# Patient Record
Sex: FEMALE | Race: OTHER | HISPANIC OR LATINO | URBAN - METROPOLITAN AREA
[De-identification: names, ages, dates, MRNs, and addresses within clinical notes are randomized per-mention and may not be internally consistent; named-entity substitution may affect disease eponyms.]

---

## 2020-12-08 ENCOUNTER — EMERGENCY (EMERGENCY)
Facility: HOSPITAL | Age: 25
LOS: 1 days | Discharge: HOME | End: 2020-12-08
Attending: EMERGENCY MEDICINE
Payer: COMMERCIAL

## 2020-12-08 VITALS
OXYGEN SATURATION: 97 % | WEIGHT: 175.05 LBS | RESPIRATION RATE: 20 BRPM | SYSTOLIC BLOOD PRESSURE: 138 MMHG | HEIGHT: 60 IN | DIASTOLIC BLOOD PRESSURE: 83 MMHG | TEMPERATURE: 98 F | HEART RATE: 90 BPM

## 2020-12-08 LAB
APPEARANCE UR: CLEAR — SIGNIFICANT CHANGE UP
BILIRUB UR-MCNC: NEGATIVE — SIGNIFICANT CHANGE UP
COLOR SPEC: YELLOW — SIGNIFICANT CHANGE UP
DIFF PNL FLD: ABNORMAL
GLUCOSE UR QL: NEGATIVE — SIGNIFICANT CHANGE UP
HCG UR QL: NEGATIVE — SIGNIFICANT CHANGE UP
KETONES UR-MCNC: NEGATIVE — SIGNIFICANT CHANGE UP
LEUKOCYTE ESTERASE UR-ACNC: NEGATIVE — SIGNIFICANT CHANGE UP
NITRITE UR-MCNC: NEGATIVE — SIGNIFICANT CHANGE UP
PH UR: 6 — SIGNIFICANT CHANGE UP (ref 5–8)
PROT UR-MCNC: NEGATIVE — SIGNIFICANT CHANGE UP
SP GR SPEC: 1.01 — SIGNIFICANT CHANGE UP (ref 1.01–1.02)
UROBILINOGEN FLD QL: NEGATIVE — SIGNIFICANT CHANGE UP

## 2020-12-08 PROCEDURE — 99284 EMERGENCY DEPT VISIT MOD MDM: CPT

## 2020-12-08 RX ORDER — IBUPROFEN 200 MG
600 TABLET ORAL ONCE
Refills: 0 | Status: COMPLETED | OUTPATIENT
Start: 2020-12-08 | End: 2020-12-08

## 2020-12-09 LAB
CULTURE RESULTS: SIGNIFICANT CHANGE UP
SPECIMEN SOURCE: SIGNIFICANT CHANGE UP

## 2020-12-09 PROCEDURE — 81001 URINALYSIS AUTO W/SCOPE: CPT

## 2020-12-09 PROCEDURE — 74176 CT ABD & PELVIS W/O CONTRAST: CPT | Mod: 26

## 2020-12-09 PROCEDURE — 81025 URINE PREGNANCY TEST: CPT

## 2020-12-09 PROCEDURE — 87086 URINE CULTURE/COLONY COUNT: CPT

## 2020-12-09 PROCEDURE — 99284 EMERGENCY DEPT VISIT MOD MDM: CPT | Mod: 25

## 2020-12-09 PROCEDURE — 74176 CT ABD & PELVIS W/O CONTRAST: CPT

## 2020-12-09 RX ADMIN — Medication 600 MILLIGRAM(S): at 00:10

## 2020-12-09 RX ADMIN — Medication 600 MILLIGRAM(S): at 00:45

## 2020-12-09 NOTE — ED PROVIDER NOTE - MUSCULOSKELETAL, MLM
Spine appears normal, range of motion is not limited, no muscle or joint tenderness Spine appears normal, range of motion is not limited, no muscle or joint tenderness. Normal gait. Normal LE strength and sensory function.

## 2020-12-09 NOTE — ED PROVIDER NOTE - PATIENT PORTAL LINK FT
You can access the FollowMyHealth Patient Portal offered by [facility] by registering at the following website: [email or web address]. By joining [employer]’s FollowMyHealth portal, you will also be able to view your health information using other applications (apps) compatible with our system.

## 2020-12-09 NOTE — ED ADULT NURSE NOTE - OBJECTIVE STATEMENT
pt came in with complaints of right flank pain started 2 days ago. Denies N/V/D, chest pain, sob and fever.

## 2020-12-09 NOTE — ED PROVIDER NOTE - NSFOLLOWUPINSTRUCTIONS_ED_ALL_ED_FT
For pain continue tylenol 650mg or ibuprofen (advil/motrin) 600mg every 6 hours.  Followup with PMD for reevaluation.  Return to ED if you develop fever>100.8F, vomiting, abdominal pain or worsening back pain.      Flank Pain, Adult      Flank pain is pain in your side. The flank is the area of your side between your upper belly (abdomen) and your back. The pain may occur over a short time (acute), or it may be long-term or come back often (chronic). It may be mild or very bad. Pain in this area can be caused by many different things.      Follow these instructions at home:     •Drink enough fluid to keep your pee (urine) clear or pale yellow.      •Rest as told by your doctor.      •Take over-the-counter and prescription medicines only as told by your doctor.    •Keep a journal to keep track of:  •What has caused your flank pain.      •What has made it feel better.        •Keep all follow-up visits as told by your doctor. This is important.        Contact a doctor if:    •Medicine does not help your pain.      •You have new symptoms.      •Your pain gets worse.      •You have a fever.      •Your symptoms last longer than 2–3 days.      •You have trouble peeing.      •You are peeing more often than normal.        Get help right away if:    •You have trouble breathing.      •You are short of breath.      •Your belly hurts, or it is swollen or red.      •You feel sick to your stomach (nauseous).      •You throw up (vomit).      •You feel like you will pass out, or you do pass out (faint).      •You have blood in your pee.        Summary    •Flank pain is pain in your side. The flank is the area of your side between your upper belly (abdomen) and your back.      •Flank pain may occur over a short time (acute), or it may be long-term or come back often (chronic). It may be mild or very bad.      •Pain in this area can be caused by many different things.      •Contact your doctor if your symptoms get worse or they last longer than 2–3 days.

## 2020-12-09 NOTE — ED PROVIDER NOTE - CLINICAL SUMMARY MEDICAL DECISION MAKING FREE TEXT BOX
25F with R flank pain. UA, CT abd r/o renal colic, ibuprofen for pain and reassess. 25F with R flank pain. UA, CT abd r/o renal colic, ibuprofen for pain and reassess.  UA neg for UTI, small blood evident, CT shows No radiodense calculus. No hydroureteronephrosis.  Discussed above with patient. Patient stable for discharge to followup with PMD.

## 2020-12-09 NOTE — ED PROVIDER NOTE - OBJECTIVE STATEMENT
26 y/o female with no significant PMHx presents to the ED c/o R flank pain x 2 days. Pt notes a moderate dull R flank pain and associated urinary frequency. Pt denies dysuria, hematuria, fever, cough, vomiting, abd pain, or any other complaints. LMP x 2 weeks. NKDA. 26 y/o female with no significant PMHx presents to the ED c/o R flank pain x 2 days. Pt notes a moderate dull R flank pain and associated urinary frequency. Pt denies dysuria, hematuria, fever, cough, vomiting, abd pain, or any other complaints. LMP x 2 weeks. NKDA. Denies urinary/bowel incontinence, denies leg weakness or numbness, hx IVDU.